# Patient Record
Sex: FEMALE | Employment: FULL TIME | URBAN - METROPOLITAN AREA
[De-identification: names, ages, dates, MRNs, and addresses within clinical notes are randomized per-mention and may not be internally consistent; named-entity substitution may affect disease eponyms.]

---
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
## 2024-02-29 NOTE — TELEPHONE ENCOUNTER
FUTURE VISIT INFORMATION      FUTURE VISIT INFORMATION:  Date: 5/28/24  Time: 9:00am  [place] [facility] [place]ERR[place] INFORMATION:  Reason for visit/diagnosis  top consult    RECORDS REQUESTED FROM:       No recs to collect

## 2024-05-14 ENCOUNTER — TELEPHONE (OUTPATIENT)
Dept: PLASTIC SURGERY | Facility: CLINIC | Age: 19
End: 2024-05-14
Payer: COMMERCIAL

## 2024-05-28 ENCOUNTER — OFFICE VISIT (OUTPATIENT)
Dept: PLASTIC SURGERY | Facility: CLINIC | Age: 19
End: 2024-05-28
Payer: COMMERCIAL

## 2024-05-28 ENCOUNTER — PRE VISIT (OUTPATIENT)
Dept: PLASTIC SURGERY | Facility: CLINIC | Age: 19
End: 2024-05-28

## 2024-05-28 VITALS
BODY MASS INDEX: 29.02 KG/M2 | HEART RATE: 76 BPM | HEIGHT: 64 IN | WEIGHT: 170 LBS | SYSTOLIC BLOOD PRESSURE: 120 MMHG | DIASTOLIC BLOOD PRESSURE: 79 MMHG | OXYGEN SATURATION: 98 %

## 2024-05-28 DIAGNOSIS — Z78.9 TRANSGENDER: ICD-10-CM

## 2024-05-28 PROCEDURE — 99203 OFFICE O/P NEW LOW 30 MIN: CPT | Performed by: SURGERY

## 2024-05-28 RX ORDER — ASCORBIC ACID 250 MG
TABLET,CHEWABLE ORAL
COMMUNITY
Start: 2018-06-01

## 2024-05-28 RX ORDER — CLINDAMYCIN PHOSPHATE 10 UG/ML
LOTION TOPICAL
COMMUNITY
Start: 2023-05-05

## 2024-05-28 RX ORDER — TRETINOIN 0.25 MG/G
CREAM TOPICAL
COMMUNITY
Start: 2023-05-05

## 2024-05-28 RX ORDER — OMEGA-3S/DHA/EPA/FISH OIL/D3 300MG-1000
1 CAPSULE ORAL
COMMUNITY
Start: 2016-11-03

## 2024-05-28 RX ORDER — TESTOSTERONE ENANTHATE 200 MG/ML
200 INJECTION, SOLUTION INTRAMUSCULAR
COMMUNITY
Start: 2024-01-11

## 2024-05-28 ASSESSMENT — PAIN SCALES - GENERAL: PAINLEVEL: NO PAIN (0)

## 2024-05-28 NOTE — LETTER
"5/28/2024       RE: Itzel Avilez  [address] [address]     Dear Colleague,    Thank you for referring your patient, Itzel Avilez, to the [facility] [place] [facility]. Please see a copy of my visit note below.    PLASTICS NEW TOP   HPI: This is a 18 year old adult who identifies as nonbinary with a history of gender dysphoria and ITP who presents today with their mom, Majo for a consultation for top surgery. Their preferred pronouns are he/him; their preferred name is Huang. They were referred by [facility] where he is already a patient. They made their appointment 5 months ago. Their therapist is Teresa Martinez at [facility] who has not yet written them a letter of support. He has been seeing her for about 9 months. They have been on testosterone for about 8 months (weekly injection), administered by Dr. Marika Stovall at [facility]. They have been living their chosen gender identity/role for 3 years. They bind with a Spectrum binder, \"GC2B hurts.\" No breast lumps, skin puckering, nipple drainage, or other breast problems. No history of breast imaging, including mammogram or breast ultrasound.     He and his mom are from [place], [place]. Huang drove about an hour to be here today.      Medical Hx: Gender dysphoria. No history of asthma, diabetes mellitus, or GERD. No healing or scarring problems. Sees Dr. Tonny Munoz at [facility] for ITP. Seasonal allergies. Also can't take ibuprofen due to bleeding issues. Last time they were checked platelets were 135k, which is very good for him. More often they are in the 30'-40's.    Mental Hx: Generalized anxiety disorder.    Surgical Hx: [place] teeth removed at age 15.  Bone marrow biopsy in 6th grade at [employer]. No known complications with anesthesia.     Family Hx: No family history of " "breast or ovarian cancer. Paternal Grandma had a hysterectomy for unknown reasons. Dad has WPW. Mom is healthy. Maternal grandmother has hypertension, type II diabetes and coronary artery disease. Has a brother, \"he's the healthiest person I've ever met in my life.\"    Social Hx: Occupation: Psychology major, minors in neuroscience and substance abuse. Will be a ev. Relationship status/family: is a  at [employer] but otherwise is living at home with parents; Mom has a friend who's son has had top surgery, so mom feels prepared to  help take care of them after surgery. Smoking status: negative.  Alcohol use: social. Diet: chicken and vegetables,Omnivore. Caffeine: \"I love caffeine.. a lot of espresso... I try and keep it to 1 per day (2-3 per day during school, especially finals).\" Exercise: hiking and walking most days. Sleep: \"I love sleep\" takes melatonin. Averages 8-10 hours.    PE: General: Height: 5' 4\" Weight: 170 lbs 0 oz BMI: 29.18 kg/m   Chest:   Nice upper chest contour.   Grade 2 nipple ptosis.   right breast (400-500g) is slightly larger than the left (300-400 g).   IMFs situated about 1-2 cms below the pec muscle.   Incisions may touch at midline  left IMF situated about 0.5 cm lower than the right.   minor lateral thoracic rolls.   Slight anterior axillary fullness.    Minimal striae.  No lymphadenopathy or masses.   Very dense no masses  Photos taken with consent.     A&P: 18 year old adult who identifies as nonbinary who is a good candidate for gender affirming top surgery with one of the following: bilateral simple mastectomy vs. breast reduction, +/- possible nipple graft reconstruction. They will most likely need a bilateral simple mastectomy with nipple graft reconstruction depending on intraoperative findings and the patient's desired outcome. The patient is NOT interested in nipple grafts. The patient will NOT need a pre-op mammogram in addition to an H&P from their " PCP. They are interested in nipple tattoos, and are recommended to see M at [facility].       They did not meet with our Transgender Care , Agustina, but they will be in contact via [employer] to discuss communications with staff and timeline. They did receive an introductory folder of information and contact numbers/names. Any further discussion of risks and complications will be reviewed during the pre-op visit.    Patient accepts the risks of this procedure and would like to proceed with surgery. They are able to give informed consent for this medically necessary procedure. Once we receive and review their therapist letter of support we will initiate the prior authorization process.     According to [place] Case Law and [facility] standards of care, with an appropriate letter of support from a mental health provider, top surgery/mastectomy is medically necessary for the treatment of gender dysphoria.     Total time = 30 minutes, spent on the date of encounter doing chart review, history and physical, dressing changes, documentation, patient education, and any further activity as noted above.     This note was prepared on behalf of Oksana Garcia MD by Ashleigh Rosado (they/them), a trained medical scribe, based on my observations and the provider's statements to me.         Again, thank you for allowing me to participate in the care of your patient.      Sincerely,    Oksana Garcia MD

## 2024-05-28 NOTE — PROGRESS NOTES
"PLASTICS [facility]   HPI: This is a 18 year old adult who identifies as nonbinary with a history of gender dysphoria and ITP who presents today with their mom, Majo for a consultation for top surgery. Their preferred pronouns are he/him; their preferred name is Huang. They were referred by [facility] where he is already a patient. They made their appointment 5 months ago. Their therapist is Teresa Martinez at [facility] who has not yet written them a letter of support. He has been seeing her for about 9 months. They have been on testosterone for about 8 months (weekly injection), administered by Dr. Marika Stovall at [facility]. They have been living their chosen gender identity/role for 3 years. They bind with a Spectrum binder, \"GC2B hurts.\" No breast lumps, skin puckering, nipple drainage, or other breast problems. No history of breast imaging, including mammogram or breast ultrasound.     He and his mom are from [place], [place]. Huang drove about an hour to be here today.      Medical Hx: Gender dysphoria. No history of asthma, diabetes mellitus, or GERD. No healing or scarring problems. Sees Dr. Tonny Munoz at [facility] for ITP. Seasonal allergies. Also can't take ibuprofen due to bleeding issues. Last time they were checked platelets were 135k, which is very good for him. More often they are in the 30'-40's.    Mental Hx: Generalized anxiety disorder.    Surgical Hx: [place] teeth removed at age 15.  Bone marrow biopsy in 6th grade at [employer]. No known complications with anesthesia.     Family Hx: No family history of breast or ovarian cancer. Paternal Grandma had a hysterectomy for unknown reasons. Dad has WPW. Mom is healthy. Maternal grandmother has hypertension, type II diabetes and coronary artery disease. Has a brother, \"he's the healthiest person I've ever met in my life.\"    Social Hx: Occupation: Psychology major, minors in " "neuroscience and substance abuse. Will be a ev. Relationship status/family: is a  at [employer] but otherwise is living at home with parents; Mom has a friend who's son has had top surgery, so mom feels prepared to  help take care of them after surgery. Smoking status: negative.  Alcohol use: social. Diet: chicken and vegetables,Omnivore. Caffeine: \"I love caffeine.. a lot of espresso... I try and keep it to 1 per day (2-3 per day during school, especially finals).\" Exercise: hiking and walking most days. Sleep: \"I love sleep\" takes melatonin. Averages 8-10 hours.    PE: General: Height: 5' 4\" Weight: 170 lbs 0 oz BMI: 29.18 kg/m   Chest:   Nice upper chest contour.   Grade 2 nipple ptosis.   right breast (400-500g) is slightly larger than the left (300-400 g).   IMFs situated about 1-2 cms below the pec muscle.   Incisions may touch at midline  left IMF situated about 0.5 cm lower than the right.   minor lateral thoracic rolls.   Slight anterior axillary fullness.    Minimal striae.  No lymphadenopathy or masses.   Very dense no masses  Photos taken with consent.     A&P: 18 year old adult who identifies as nonbinary who is a good candidate for gender affirming top surgery with one of the following: bilateral simple mastectomy vs. breast reduction, +/- possible nipple graft reconstruction. They will most likely need a bilateral simple mastectomy with nipple graft reconstruction depending on intraoperative findings and the patient's desired outcome. The patient is NOT interested in nipple grafts. The patient will NOT need a pre-op mammogram in addition to an H&P from their PCP. They are interested in nipple tattoos, and are recommended to see M at [facility].       They did not meet with our Transgender Care , Agustina, but they will be in contact via [employer] to discuss communications with staff and timeline. They did receive an introductory folder of information and contact " numbers/names. Any further discussion of risks and complications will be reviewed during the pre-op visit.    Patient accepts the risks of this procedure and would like to proceed with surgery. They are able to give informed consent for this medically necessary procedure. Once we receive and review their therapist letter of support we will initiate the prior authorization process.     According to [place] Case Law and [facility] standards of care, with an appropriate letter of support from a mental health provider, top surgery/mastectomy is medically necessary for the treatment of gender dysphoria.     Total time = 30 minutes, spent on the date of encounter doing chart review, history and physical, dressing changes, documentation, patient education, and any further activity as noted above.     This note was prepared on behalf of Oksana Garcia MD by Ashleigh Rosado (they/them), a trained medical scribe, based on my observations and the provider's statements to me.

## 2024-05-28 NOTE — NURSING NOTE
"Chief Complaint   Patient presents with    Consult     Huang, is being seen today for a consult regarding top surgery.       Vitals:    05/28/24 0922   BP: 120/79   BP Location: Left arm   Patient Position: Chair   Cuff Size: Adult Regular   Pulse: 76   SpO2: 98%   Weight: 77.1 kg (170 lb)   Height: 1.626 m (5' 4\")       Body mass index is 29.18 kg/m .      Francie Mcbride LPN    "

## 2024-06-06 ENCOUNTER — TRANSFERRED RECORDS (OUTPATIENT)
Dept: HEALTH INFORMATION MANAGEMENT | Facility: CLINIC | Age: 19
End: 2024-06-06
Payer: COMMERCIAL

## 2024-06-17 ENCOUNTER — DOCUMENTATION ONLY (OUTPATIENT)
Dept: PLASTIC SURGERY | Facility: CLINIC | Age: 19
End: 2024-06-17
Payer: COMMERCIAL

## 2024-06-17 NOTE — PROGRESS NOTES
[facility] :  Care Coordination Note     SITUATION   Huang Avilez (they/he) is a 18 year old adult who is receiving support for:  Care Team (LOS Review)  .    BACKGROUND     LOS received and meets criteria (faxed to HIM from writers email dated 6/15/24. Forwarded to PA coordinator and RNCC). Updated pt list. Sent message to pt updating them on LOS status.    ASSESSMENT     Surgery              CGC Assessment  Comprehensive Gender Care (CGC) Enrollment: Enrolled  Letter of support #1: Received  Letter #1 Date: 05/28/24  Surgery being considered: Yes  Mastectomy: Yes      PLAN          Nursing Interventions:       Follow-up plan:  Surgeon will place ALON Ybarra

## 2024-07-08 DIAGNOSIS — F64.0 GENDER DYSPHORIA IN ADULT: Primary | ICD-10-CM

## 2024-07-09 ENCOUNTER — MYC MEDICAL ADVICE (OUTPATIENT)
Dept: PLASTIC SURGERY | Facility: CLINIC | Age: 19
End: 2024-07-09
Payer: COMMERCIAL

## 2024-07-09 ENCOUNTER — TELEPHONE (OUTPATIENT)
Dept: PLASTIC SURGERY | Facility: CLINIC | Age: 19
End: 2024-07-09
Payer: COMMERCIAL

## 2024-07-09 NOTE — TELEPHONE ENCOUNTER
RN Care Coordinator: Karly Hilliardnikicalixto    Surgery is scheduled with Dr. Garcia  Date: 12/30/2024   Location: [facility]    H&P to be completed by Primary Care team - patient instructed to schedule PCP per patient, this will be scheduled with [facility] Ramana SUNSHINE.     Surgical consult: 11/19/2024 at 8:30am with Dr. Garcia    Post-op visit(s):   1/6/2025 at 10:30am with GERARDO Weiner CNP  2/4/2024 at 10:30am with Dr. Garcia      Patient will receive a phone call from surgery center pre-operative nurses 3-5 days prior to surgery with arrival time and NPO instructions.     Spoke with patient, confirmed all scheduled information.       Patient questions/concerns: N/A       Surgery packet to be sent via US mail    __    Helena Galdamez on 7/9/2024 at 3:34 PM  P: [phone]

## 2024-07-10 ENCOUNTER — DOCUMENTATION ONLY (OUTPATIENT)
Dept: PLASTIC SURGERY | Facility: CLINIC | Age: 19
End: 2024-07-10
Payer: COMMERCIAL

## 2024-07-10 NOTE — PROGRESS NOTES
[facility] :  Care Coordination Note     SITUATION   Huang is a 18 year old adult who is receiving support for: gender dysphoria.    BACKGROUND     Pt has seen Dr Garcia for gender affirming top surgery consultation.  Pt has surgery scheduled for 12/30/24.    ASSESSMENT     Pt LOS in chart dated 5/28/24 and reviewed by our .  Pt will not need a pre-op mammogram. Pt does not use nicotine.  Pt has ITP that can fluctuate greatly.       PLAN     Follow-up plan:    Will contact pt to inform of need for pre-op H&P within 30 days of surgery.  RN will contact patient's heme/onc medical provider to get results of latest platelet count and to get advice on debbie-operative and post-operative needs, if any.   Pt to have pre-op surgical consultation with Dr Garcia 11/19/24.     ABIGAIL Brandt

## 2024-07-13 ENCOUNTER — HEALTH MAINTENANCE LETTER (OUTPATIENT)
Age: 19
End: 2024-07-13

## 2024-07-19 ENCOUNTER — TRANSFERRED RECORDS (OUTPATIENT)
Dept: HEALTH INFORMATION MANAGEMENT | Facility: CLINIC | Age: 19
End: 2024-07-19
Payer: COMMERCIAL

## 2024-11-04 ENCOUNTER — PREP FOR PROCEDURE (OUTPATIENT)
Dept: OCCUPATIONAL THERAPY | Facility: CLINIC | Age: 19
End: 2024-11-04
Payer: COMMERCIAL

## 2024-11-19 ENCOUNTER — VIRTUAL VISIT (OUTPATIENT)
Dept: PLASTIC SURGERY | Facility: CLINIC | Age: 19
End: 2024-11-19
Payer: COMMERCIAL

## 2024-11-19 ENCOUNTER — TELEPHONE (OUTPATIENT)
Dept: PLASTIC SURGERY | Facility: CLINIC | Age: 19
End: 2024-11-19

## 2024-11-19 DIAGNOSIS — F64.0 GENDER DYSPHORIA IN ADULT: Primary | ICD-10-CM

## 2024-11-19 ASSESSMENT — PAIN SCALES - GENERAL: PAINLEVEL_OUTOF10: NO PAIN (0)

## 2024-11-19 NOTE — PROGRESS NOTES
"PLASTICS PRE-OP  This is a 19 year old non-binary adult who presents for a scheduled pre-op visit via telehealth prior to bilateral simple mastectomy with nipple graft reconstruction scheduled for 12/30/24. The patient's letter of support has been received. History and physical still to be scheduled.    NO NSAIDS due to ITP.   We will get an ARMANI to speak to Dr. Munoz who follows Huang's ITP (though we did get verbal permission from Huang already during our visit today).     Last time they took medications for increasing platelet count was when they were \"like 11\" and they took PO pills at that time. Their most recent count was in the 30,000 range. Numbers have dipped in the past after Huang suffered a URI. We are waiting for his labs drawn just yesterday.    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 Everything is prepped and ready to go so Huang will have no issues following post-op restrictions.     Patient was counseled regarding the need to quit smoking nicotine products within at least 6 weeks before surgery date and agreed to these terms at the original consult appointment. Patient has not been a smoker in the past and continues to abstain from nicotine use.     Our Gender Care Team member Karly, discussed periop instructions with the patient including: not eating anything 8 hours prior to surgery, drinking clear liquids up to 2 hours before surgery except for morning medications with a sip of water, the preop shower with surgical soap which was given, and wearing a button- or zip-up shirt on the day of surgery. The patient was also instructed to avoid NSAIDs x 1 week both before AND after surgery, but he may take Tylenol post-op for pain as needed. The patient was provided with a folder of information on debbie-op topics, including where the surgery will be.     I discussed the following with the patient; preop, intraop and postop phases of care on the day of surgery, " the placement of a bladder catheter during surgery that will likely be removed in recovery, postop cares and limitations with relation to home and work settings, 5-lb weight restriction for the first 3 weeks postop, and how long to maintain limited activities. We also discussed Zofran, oxycodone, Z-luciana, and antipruritics which will be prescribed. We discussed that preventing constipation will be their responsibility, and we discussed methods such as aloe, prune juice or Miralax. We discussed scar reducing techniques, such as Mederma, silicone strips, vitamin E oil, emu oil, massage and when they may begin using these.     In addition, we went over the possible risks and complications involved with this elective procedure. These include but are not limited to: infection, bleeding, hematoma/seroma formation, and poor healing (including dehiscence, nipple graft loss, or hypertrophic scarring). We also discussed the possibility of altered chest sensation (either hypo or hypersensitive), residual deformities and asymmetries, possible further surgical revisions, and possible injury to surrounding neurovascular and musculoskeletal structures, including intra-axillary or intra-thoracic. We lastly discussed anesthetic risks including DVT/PE or cardiopulmonary events.      All questions were answered. Huang will bring any other questions that arise to the day of surgery.    Total time = 20 minutes, spent on the date of encounter doing chart review, history and physical, dressing changes, documentation, patient education, and any further activity as noted above.     This note was prepared on behalf of Oksana Garcia MD by Juana Rodgers (she/her), a trained medical scribe, based on my observations and the provider's statements to me.

## 2024-11-19 NOTE — NURSING NOTE
Chief Complaint   Patient presents with    YULISA Encinas, is participating in a virtual visit today for a pre-op DOS 12/30/24.       There were no vitals filed for this visit.    There is no height or weight on file to calculate BMI.      Francie Mcbride LPN

## 2024-11-19 NOTE — PROGRESS NOTES
Huang is a 19 year old who is being evaluated via a billable video visit.    How would you like to obtain your AVS? MyChart  If the video visit is dropped, the invitation should be resent by: Text to cell phone: [phone]  Will anyone else be joining your video visit? No

## 2024-11-19 NOTE — LETTER
"11/19/2024       RE: Itzel Avilez  [address] [address]     Dear Colleague,    Thank you for referring your patient, Itzel Avilez, to the [facility] [place] [facility]. Please see a copy of my visit note below.    Huang is a 19 year old who is being evaluated via a billable video visit.    How would you like to obtain your AVS? MyChart  If the video visit is dropped, the invitation should be resent by: Text to cell phone: [phone]  Will anyone else be joining your video visit? No        PLASTICS PRE-OP  This is a 19 year old non-binary adult who presents for a scheduled pre-op visit via telehealth prior to bilateral simple mastectomy with nipple graft reconstruction scheduled for 12/30/24. The patient's letter of support has been received. History and physical still to be scheduled.    NO NSAIDS due to ITP.   We will get an ARMANI to speak to Dr. Munoz who follows Huang's ITP (though we did get verbal permission from Huang already during our visit today).     Last time they took medications for increasing platelet count was when they were \"like 11\" and they took PO pills at that time. Their most recent count was in the 30,000 range. Numbers have dipped in the past after Huang suffered a URI. We are waiting for his labs drawn just yesterday.    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 Everything is prepped and ready to go so Huang will have no issues following post-op restrictions.     Patient was counseled regarding the need to quit smoking nicotine products within at least 6 weeks before surgery date and agreed to these terms at the original consult appointment. Patient has not been a smoker in the past and continues to abstain from nicotine use.     Our Gender Care Team member Karly, discussed periop instructions with the patient including: not eating " anything 8 hours prior to surgery, drinking clear liquids up to 2 hours before surgery except for morning medications with a sip of water, the preop shower with surgical soap which was given, and wearing a button- or zip-up shirt on the day of surgery. The patient was also instructed to avoid NSAIDs x 1 week both before AND after surgery, but he may take Tylenol post-op for pain as needed. The patient was provided with a folder of information on debbie-op topics, including where the surgery will be.     I discussed the following with the patient; preop, intraop and postop phases of care on the day of surgery, the placement of a bladder catheter during surgery that will likely be removed in recovery, postop cares and limitations with relation to home and work settings, 5-lb weight restriction for the first 3 weeks postop, and how long to maintain limited activities. We also discussed Zofran, oxycodone, Z-luciana, and antipruritics which will be prescribed. We discussed that preventing constipation will be their responsibility, and we discussed methods such as aloe, prune juice or Miralax. We discussed scar reducing techniques, such as Mederma, silicone strips, vitamin E oil, emu oil, massage and when they may begin using these.     In addition, we went over the possible risks and complications involved with this elective procedure. These include but are not limited to: infection, bleeding, hematoma/seroma formation, and poor healing (including dehiscence, nipple graft loss, or hypertrophic scarring). We also discussed the possibility of altered chest sensation (either hypo or hypersensitive), residual deformities and asymmetries, possible further surgical revisions, and possible injury to surrounding neurovascular and musculoskeletal structures, including intra-axillary or intra-thoracic. We lastly discussed anesthetic risks including DVT/PE or cardiopulmonary events.      All questions were answered. Huang will bring any  other questions that arise to the day of surgery.    Total time = 20 minutes, spent on the date of encounter doing chart review, history and physical, dressing changes, documentation, patient education, and any further activity as noted above.     This note was prepared on behalf of Oksana Garcia MD by Juana Rodgers (she/her), a trained medical scribe, based on my observations and the provider's statements to me.          Again, thank you for allowing me to participate in the care of your patient.      Sincerely,    Oksana Garcia MD

## 2024-11-19 NOTE — TELEPHONE ENCOUNTER
Dr. Hong's office reached out to this Writer ([facility]) to discuss patient's ITP management. Huang's last platelet was 72K taken yesterday. Hal's nurse states their goal for him is to be above 50K and asked RN what Jose's goal was. RN explained this is usually > 55K for our patients but we usually follow heme's advice. Dr. Hong's NAT, [employer], will see Huang the week of surgery and get another Platelt. Sara will reach out about our request for debbie-op and post-op management of ITP.  Karly De Jesus, ABIGAIL on 11/19/2024 at 11:04 AM

## 2024-12-19 ENCOUNTER — TELEPHONE (OUTPATIENT)
Dept: PLASTIC SURGERY | Facility: CLINIC | Age: 19
End: 2024-12-19
Payer: COMMERCIAL

## 2024-12-19 NOTE — TELEPHONE ENCOUNTER
RN called NAT at [facility] to follow up on Huang's upcoming surgery. Spoke to ABIGAIL Marmolejo.  Huang has his appoitntment Monday 12/23/25. Labs will be done (CMP and CBC) and these will be faxed to us along with any recommendations to us for debbie-op care  Karly De Jesus RN on 12/19/2024 at 4:19 PM

## 2024-12-23 ENCOUNTER — TRANSFERRED RECORDS (OUTPATIENT)
Dept: HEALTH INFORMATION MANAGEMENT | Facility: CLINIC | Age: 19
End: 2024-12-23
Payer: COMMERCIAL

## 2024-12-23 RX ORDER — GABAPENTIN 600 MG/1
600 TABLET ORAL DAILY
COMMUNITY

## 2024-12-24 ENCOUNTER — TELEPHONE (OUTPATIENT)
Dept: PLASTIC SURGERY | Facility: CLINIC | Age: 19
End: 2024-12-24
Payer: COMMERCIAL

## 2024-12-24 NOTE — TELEPHONE ENCOUNTER
Received Fax information from hematology. (In chart)    RN discussed faxed info with Dr Garcia.   Summary:  Plt - 69K. Recheck scheduled 12/27/24 at [facility].     Plan - if recheck plt is < 50K, Heme will give dexamethasone to increase counts over the weekend. Heme will follow plt labs for at least 2 weeks after surgery. Refill of TXA given to pt by Heme.    Surgery team will have TXA available for use in OR.

## 2024-12-29 ENCOUNTER — ANESTHESIA EVENT (OUTPATIENT)
Dept: SURGERY | Facility: CLINIC | Age: 19
End: 2024-12-29

## 2024-12-30 ENCOUNTER — ANESTHESIA (OUTPATIENT)
Dept: SURGERY | Facility: CLINIC | Age: 19
End: 2024-12-30

## 2024-12-30 ENCOUNTER — HOSPITAL ENCOUNTER (OUTPATIENT)
Facility: CLINIC | Age: 19
Discharge: HOME | End: 2024-12-30
Attending: SURGERY | Admitting: SURGERY
Payer: COMMERCIAL

## 2024-12-30 VITALS
HEART RATE: 75 BPM | DIASTOLIC BLOOD PRESSURE: 83 MMHG | RESPIRATION RATE: 18 BRPM | TEMPERATURE: 97.7 F | BODY MASS INDEX: 29.92 KG/M2 | OXYGEN SATURATION: 99 % | SYSTOLIC BLOOD PRESSURE: 141 MMHG | WEIGHT: 168.87 LBS | HEIGHT: 63 IN

## 2024-12-30 PROCEDURE — 999N000141 HC STATISTIC PRE-PROCEDURE NURSING ASSESSMENT: Performed by: SURGERY

## 2024-12-30 PROCEDURE — 999N000001 HC CANCELLED SURGERY UP TO 15 MINS: Performed by: SURGERY

## 2024-12-30 RX ORDER — CEFAZOLIN SODIUM/WATER 2 G/20 ML
2 SYRINGE (ML) INTRAVENOUS SEE ADMIN INSTRUCTIONS
Status: DISCONTINUED | OUTPATIENT
Start: 2024-12-30 | End: 2024-12-30 | Stop reason: HOSPADM

## 2024-12-30 RX ORDER — CEFAZOLIN SODIUM/WATER 2 G/20 ML
2 SYRINGE (ML) INTRAVENOUS
Status: DISCONTINUED | OUTPATIENT
Start: 2024-12-30 | End: 2024-12-30 | Stop reason: HOSPADM

## 2024-12-30 ASSESSMENT — ACTIVITIES OF DAILY LIVING (ADL): ADLS_ACUITY_SCORE: 35

## 2024-12-30 NOTE — PROGRESS NOTES
Surgery was cancelled due to surgeon unable to proceed due to illness. Situation was explained to patient and his family, parking was validated - patient and family acknowledged understanding. Total of 31 minutes spent with patient.

## 2025-05-16 ENCOUNTER — TRANSFERRED RECORDS (OUTPATIENT)
Dept: HEALTH INFORMATION MANAGEMENT | Facility: CLINIC | Age: 20
End: 2025-05-16
Payer: COMMERCIAL

## 2025-05-19 ENCOUNTER — ANESTHESIA EVENT (OUTPATIENT)
Dept: SURGERY | Facility: CLINIC | Age: 20
End: 2025-05-19
Payer: COMMERCIAL

## 2025-05-20 ENCOUNTER — ANESTHESIA (OUTPATIENT)
Dept: SURGERY | Facility: CLINIC | Age: 20
End: 2025-05-20
Payer: COMMERCIAL

## 2025-05-20 ENCOUNTER — HOSPITAL ENCOUNTER (OUTPATIENT)
Facility: CLINIC | Age: 20
End: 2025-05-20
Attending: SURGERY | Admitting: SURGERY
Payer: COMMERCIAL

## 2025-05-20 ENCOUNTER — TELEPHONE (OUTPATIENT)
Dept: PLASTIC SURGERY | Facility: CLINIC | Age: 20
End: 2025-05-20

## 2025-05-20 VITALS
BODY MASS INDEX: 29.8 KG/M2 | TEMPERATURE: 96.8 F | DIASTOLIC BLOOD PRESSURE: 87 MMHG | OXYGEN SATURATION: 96 % | RESPIRATION RATE: 18 BRPM | SYSTOLIC BLOOD PRESSURE: 128 MMHG | HEART RATE: 74 BPM | WEIGHT: 168.21 LBS | HEIGHT: 63 IN

## 2025-05-20 DIAGNOSIS — F64.9 GENDER DYSPHORIA: Primary | ICD-10-CM

## 2025-05-20 LAB
MCV RBC AUTO: 87 FL (ref 78–100)
PLATELET # BLD AUTO: 169 10E3/UL (ref 150–450)

## 2025-05-20 PROCEDURE — 250N000011 HC RX IP 250 OP 636: Performed by: NURSE ANESTHETIST, CERTIFIED REGISTERED

## 2025-05-20 PROCEDURE — 250N000009 HC RX 250: Performed by: ANESTHESIOLOGY

## 2025-05-20 PROCEDURE — 88305 TISSUE EXAM BY PATHOLOGIST: CPT | Mod: 26 | Performed by: PATHOLOGY

## 2025-05-20 PROCEDURE — 88305 TISSUE EXAM BY PATHOLOGIST: CPT | Mod: TC | Performed by: SURGERY

## 2025-05-20 PROCEDURE — 710N000012 HC RECOVERY PHASE 2, PER MINUTE: Performed by: SURGERY

## 2025-05-20 PROCEDURE — 250N000011 HC RX IP 250 OP 636: Performed by: ANESTHESIOLOGY

## 2025-05-20 PROCEDURE — 999N000141 HC STATISTIC PRE-PROCEDURE NURSING ASSESSMENT: Performed by: SURGERY

## 2025-05-20 PROCEDURE — 272N000001 HC OR GENERAL SUPPLY STERILE: Performed by: SURGERY

## 2025-05-20 PROCEDURE — 250N000011 HC RX IP 250 OP 636: Performed by: SURGERY

## 2025-05-20 PROCEDURE — 250N000009 HC RX 250: Performed by: SURGERY

## 2025-05-20 PROCEDURE — 710N000010 HC RECOVERY PHASE 1, LEVEL 2, PER MIN: Performed by: SURGERY

## 2025-05-20 PROCEDURE — 250N000009 HC RX 250: Performed by: NURSE ANESTHETIST, CERTIFIED REGISTERED

## 2025-05-20 PROCEDURE — 258N000003 HC RX IP 258 OP 636: Performed by: NURSE ANESTHETIST, CERTIFIED REGISTERED

## 2025-05-20 PROCEDURE — 271N000002 HC RX 271: Performed by: SURGERY

## 2025-05-20 PROCEDURE — 19303 MAST SIMPLE COMPLETE: CPT | Mod: 50 | Performed by: SURGERY

## 2025-05-20 PROCEDURE — 85049 AUTOMATED PLATELET COUNT: CPT | Performed by: NURSE ANESTHETIST, CERTIFIED REGISTERED

## 2025-05-20 PROCEDURE — 370N000017 HC ANESTHESIA TECHNICAL FEE, PER MIN: Performed by: SURGERY

## 2025-05-20 PROCEDURE — 360N000076 HC SURGERY LEVEL 3, PER MIN: Performed by: SURGERY

## 2025-05-20 PROCEDURE — 250N000013 HC RX MED GY IP 250 OP 250 PS 637: Performed by: ANESTHESIOLOGY

## 2025-05-20 PROCEDURE — 250N000025 HC SEVOFLURANE, PER MIN: Performed by: SURGERY

## 2025-05-20 PROCEDURE — 19350 NIPPLE/AREOLA RECONSTRUCTION: CPT | Mod: 50 | Performed by: SURGERY

## 2025-05-20 RX ORDER — FENTANYL CITRATE 50 UG/ML
25 INJECTION, SOLUTION INTRAMUSCULAR; INTRAVENOUS EVERY 5 MIN PRN
Status: DISCONTINUED | OUTPATIENT
Start: 2025-05-20 | End: 2025-05-26 | Stop reason: HOSPADM

## 2025-05-20 RX ORDER — PROPOFOL 10 MG/ML
INJECTION, EMULSION INTRAVENOUS PRN
Status: DISCONTINUED | OUTPATIENT
Start: 2025-05-20 | End: 2025-05-20

## 2025-05-20 RX ORDER — CEFAZOLIN SODIUM/WATER 2 G/20 ML
2 SYRINGE (ML) INTRAVENOUS
Status: DISCONTINUED | OUTPATIENT
Start: 2025-05-20 | End: 2025-05-20 | Stop reason: HOSPADM

## 2025-05-20 RX ORDER — HYDROXYZINE HYDROCHLORIDE 25 MG/1
25 TABLET, FILM COATED ORAL 3 TIMES DAILY PRN
Qty: 12 TABLET | Refills: 0 | Status: SHIPPED | OUTPATIENT
Start: 2025-05-20

## 2025-05-20 RX ORDER — TRANEXAMIC ACID 10 MG/ML
1 INJECTION, SOLUTION INTRAVENOUS ONCE
Status: COMPLETED | OUTPATIENT
Start: 2025-05-20 | End: 2025-05-20

## 2025-05-20 RX ORDER — HYDROMORPHONE HYDROCHLORIDE 1 MG/ML
0.4 INJECTION, SOLUTION INTRAMUSCULAR; INTRAVENOUS; SUBCUTANEOUS EVERY 5 MIN PRN
Status: DISCONTINUED | OUTPATIENT
Start: 2025-05-20 | End: 2025-05-26 | Stop reason: HOSPADM

## 2025-05-20 RX ORDER — DEXAMETHASONE SODIUM PHOSPHATE 4 MG/ML
4 INJECTION, SOLUTION INTRA-ARTICULAR; INTRALESIONAL; INTRAMUSCULAR; INTRAVENOUS; SOFT TISSUE
Status: DISCONTINUED | OUTPATIENT
Start: 2025-05-20 | End: 2025-05-26 | Stop reason: HOSPADM

## 2025-05-20 RX ORDER — HYDROMORPHONE HYDROCHLORIDE 1 MG/ML
0.2 INJECTION, SOLUTION INTRAMUSCULAR; INTRAVENOUS; SUBCUTANEOUS EVERY 5 MIN PRN
Status: DISCONTINUED | OUTPATIENT
Start: 2025-05-20 | End: 2025-05-26 | Stop reason: HOSPADM

## 2025-05-20 RX ORDER — ONDANSETRON 2 MG/ML
INJECTION INTRAMUSCULAR; INTRAVENOUS PRN
Status: DISCONTINUED | OUTPATIENT
Start: 2025-05-20 | End: 2025-05-20

## 2025-05-20 RX ORDER — SODIUM CHLORIDE, SODIUM LACTATE, POTASSIUM CHLORIDE, CALCIUM CHLORIDE 600; 310; 30; 20 MG/100ML; MG/100ML; MG/100ML; MG/100ML
INJECTION, SOLUTION INTRAVENOUS CONTINUOUS PRN
Status: DISCONTINUED | OUTPATIENT
Start: 2025-05-20 | End: 2025-05-20

## 2025-05-20 RX ORDER — OXYCODONE HYDROCHLORIDE 5 MG/1
5 TABLET ORAL EVERY 6 HOURS PRN
Qty: 12 TABLET | Refills: 0 | Status: SHIPPED | OUTPATIENT
Start: 2025-05-20

## 2025-05-20 RX ORDER — KETAMINE HYDROCHLORIDE 10 MG/ML
INJECTION INTRAMUSCULAR; INTRAVENOUS PRN
Status: DISCONTINUED | OUTPATIENT
Start: 2025-05-20 | End: 2025-05-20

## 2025-05-20 RX ORDER — LIDOCAINE HYDROCHLORIDE 20 MG/ML
INJECTION, SOLUTION INFILTRATION; PERINEURAL PRN
Status: DISCONTINUED | OUTPATIENT
Start: 2025-05-20 | End: 2025-05-20

## 2025-05-20 RX ORDER — ONDANSETRON 4 MG/1
4 TABLET, ORALLY DISINTEGRATING ORAL EVERY 30 MIN PRN
Status: DISCONTINUED | OUTPATIENT
Start: 2025-05-20 | End: 2025-05-26 | Stop reason: HOSPADM

## 2025-05-20 RX ORDER — SODIUM CHLORIDE, SODIUM LACTATE, POTASSIUM CHLORIDE, CALCIUM CHLORIDE 600; 310; 30; 20 MG/100ML; MG/100ML; MG/100ML; MG/100ML
INJECTION, SOLUTION INTRAVENOUS CONTINUOUS
Status: DISCONTINUED | OUTPATIENT
Start: 2025-05-20 | End: 2025-05-26 | Stop reason: HOSPADM

## 2025-05-20 RX ORDER — CEFAZOLIN SODIUM/WATER 2 G/20 ML
2 SYRINGE (ML) INTRAVENOUS
Status: COMPLETED | OUTPATIENT
Start: 2025-05-20 | End: 2025-05-20

## 2025-05-20 RX ORDER — FENTANYL CITRATE 50 UG/ML
INJECTION, SOLUTION INTRAMUSCULAR; INTRAVENOUS PRN
Status: DISCONTINUED | OUTPATIENT
Start: 2025-05-20 | End: 2025-05-20

## 2025-05-20 RX ORDER — DEXAMETHASONE SODIUM PHOSPHATE 4 MG/ML
INJECTION, SOLUTION INTRA-ARTICULAR; INTRALESIONAL; INTRAMUSCULAR; INTRAVENOUS; SOFT TISSUE PRN
Status: DISCONTINUED | OUTPATIENT
Start: 2025-05-20 | End: 2025-05-20

## 2025-05-20 RX ORDER — FENTANYL CITRATE 50 UG/ML
50 INJECTION, SOLUTION INTRAMUSCULAR; INTRAVENOUS EVERY 5 MIN PRN
Status: DISCONTINUED | OUTPATIENT
Start: 2025-05-20 | End: 2025-05-26 | Stop reason: HOSPADM

## 2025-05-20 RX ORDER — NALOXONE HYDROCHLORIDE 0.4 MG/ML
0.1 INJECTION, SOLUTION INTRAMUSCULAR; INTRAVENOUS; SUBCUTANEOUS
Status: DISCONTINUED | OUTPATIENT
Start: 2025-05-20 | End: 2025-05-26 | Stop reason: HOSPADM

## 2025-05-20 RX ORDER — ONDANSETRON 4 MG/1
4 TABLET, ORALLY DISINTEGRATING ORAL EVERY 8 HOURS PRN
Qty: 12 TABLET | Refills: 0 | Status: SHIPPED | OUTPATIENT
Start: 2025-05-20

## 2025-05-20 RX ORDER — SCOPOLAMINE 1 MG/3D
1 PATCH, EXTENDED RELEASE TRANSDERMAL
Status: DISCONTINUED | OUTPATIENT
Start: 2025-05-20 | End: 2025-05-20 | Stop reason: HOSPADM

## 2025-05-20 RX ORDER — ONDANSETRON 2 MG/ML
4 INJECTION INTRAMUSCULAR; INTRAVENOUS EVERY 30 MIN PRN
Status: DISCONTINUED | OUTPATIENT
Start: 2025-05-20 | End: 2025-05-26 | Stop reason: HOSPADM

## 2025-05-20 RX ORDER — ACETAMINOPHEN 325 MG/1
975 TABLET ORAL ONCE
Status: COMPLETED | OUTPATIENT
Start: 2025-05-20 | End: 2025-05-20

## 2025-05-20 RX ORDER — DIPHENHYDRAMINE HYDROCHLORIDE 50 MG/ML
12.5 INJECTION, SOLUTION INTRAMUSCULAR; INTRAVENOUS ONCE
Status: COMPLETED | OUTPATIENT
Start: 2025-05-20 | End: 2025-05-20

## 2025-05-20 RX ORDER — AZITHROMYCIN 250 MG/1
TABLET, FILM COATED ORAL
Qty: 6 TABLET | Refills: 0 | Status: SHIPPED | OUTPATIENT
Start: 2025-05-20

## 2025-05-20 RX ORDER — CEFAZOLIN SODIUM/WATER 2 G/20 ML
2 SYRINGE (ML) INTRAVENOUS SEE ADMIN INSTRUCTIONS
Status: DISCONTINUED | OUTPATIENT
Start: 2025-05-20 | End: 2025-05-20 | Stop reason: HOSPADM

## 2025-05-20 RX ADMIN — PHENYLEPHRINE HYDROCHLORIDE 50 MCG: 10 INJECTION INTRAVENOUS at 08:39

## 2025-05-20 RX ADMIN — DEXMEDETOMIDINE HYDROCHLORIDE 4 MCG: 100 INJECTION, SOLUTION INTRAVENOUS at 08:52

## 2025-05-20 RX ADMIN — Medication 10 MG: at 11:39

## 2025-05-20 RX ADMIN — FENTANYL CITRATE 100 MCG: 50 INJECTION INTRAMUSCULAR; INTRAVENOUS at 08:01

## 2025-05-20 RX ADMIN — Medication 10 MG: at 10:57

## 2025-05-20 RX ADMIN — Medication 20 MG: at 10:04

## 2025-05-20 RX ADMIN — AMISULPRIDE 5 MG: 2.5 INJECTION, SOLUTION INTRAVENOUS at 14:42

## 2025-05-20 RX ADMIN — Medication: at 11:59

## 2025-05-20 RX ADMIN — Medication 20 MG: at 08:50

## 2025-05-20 RX ADMIN — SCOPOLAMINE 1 PATCH: 1.5 PATCH, EXTENDED RELEASE TRANSDERMAL at 07:37

## 2025-05-20 RX ADMIN — DIPHENHYDRAMINE HYDROCHLORIDE 12.5 MG: 50 INJECTION, SOLUTION INTRAMUSCULAR; INTRAVENOUS at 13:46

## 2025-05-20 RX ADMIN — SODIUM CHLORIDE, SODIUM LACTATE, POTASSIUM CHLORIDE, AND CALCIUM CHLORIDE: .6; .31; .03; .02 INJECTION, SOLUTION INTRAVENOUS at 08:29

## 2025-05-20 RX ADMIN — ONDANSETRON 4 MG: 2 INJECTION INTRAMUSCULAR; INTRAVENOUS at 12:40

## 2025-05-20 RX ADMIN — Medication 10 MG: at 10:27

## 2025-05-20 RX ADMIN — Medication 2 G: at 08:13

## 2025-05-20 RX ADMIN — DEXAMETHASONE SODIUM PHOSPHATE 10 MG: 4 INJECTION, SOLUTION INTRAMUSCULAR; INTRAVENOUS at 08:15

## 2025-05-20 RX ADMIN — HYDROMORPHONE HYDROCHLORIDE 0.5 MG: 1 INJECTION, SOLUTION INTRAMUSCULAR; INTRAVENOUS; SUBCUTANEOUS at 08:36

## 2025-05-20 RX ADMIN — SUGAMMADEX 100 MG: 100 INJECTION, SOLUTION INTRAVENOUS at 12:08

## 2025-05-20 RX ADMIN — ACETAMINOPHEN 975 MG: 325 TABLET ORAL at 16:28

## 2025-05-20 RX ADMIN — SODIUM CHLORIDE, SODIUM LACTATE, POTASSIUM CHLORIDE, CALCIUM CHLORIDE: 600; 310; 30; 20 INJECTION, SOLUTION INTRAVENOUS at 07:56

## 2025-05-20 RX ADMIN — DEXMEDETOMIDINE HYDROCHLORIDE 12 MCG: 100 INJECTION, SOLUTION INTRAVENOUS at 08:15

## 2025-05-20 RX ADMIN — MIDAZOLAM 2 MG: 1 INJECTION INTRAMUSCULAR; INTRAVENOUS at 07:55

## 2025-05-20 RX ADMIN — HYDROMORPHONE HYDROCHLORIDE 0.25 MG: 1 INJECTION, SOLUTION INTRAMUSCULAR; INTRAVENOUS; SUBCUTANEOUS at 11:53

## 2025-05-20 RX ADMIN — ONDANSETRON 4 MG: 2 INJECTION INTRAMUSCULAR; INTRAVENOUS at 11:59

## 2025-05-20 RX ADMIN — TRANEXAMIC ACID 1 G: 10 INJECTION, SOLUTION INTRAVENOUS at 11:01

## 2025-05-20 RX ADMIN — SUGAMMADEX 100 MG: 100 INJECTION, SOLUTION INTRAVENOUS at 12:11

## 2025-05-20 RX ADMIN — Medication 30 MG: at 08:42

## 2025-05-20 RX ADMIN — LIDOCAINE HYDROCHLORIDE 100 MG: 20 INJECTION, SOLUTION INFILTRATION; PERINEURAL at 08:01

## 2025-05-20 RX ADMIN — HYDROMORPHONE HYDROCHLORIDE 0.25 MG: 1 INJECTION, SOLUTION INTRAMUSCULAR; INTRAVENOUS; SUBCUTANEOUS at 09:02

## 2025-05-20 RX ADMIN — Medication 50 MG: at 08:01

## 2025-05-20 RX ADMIN — DIPHENHYDRAMINE HYDROCHLORIDE 12.5 MG: 50 INJECTION, SOLUTION INTRAMUSCULAR; INTRAVENOUS at 13:07

## 2025-05-20 RX ADMIN — PROPOFOL 200 MG: 10 INJECTION, EMULSION INTRAVENOUS at 08:01

## 2025-05-20 ASSESSMENT — ACTIVITIES OF DAILY LIVING (ADL)
ADLS_ACUITY_SCORE: 36
ADLS_ACUITY_SCORE: 36
ADLS_ACUITY_SCORE: 35
ADLS_ACUITY_SCORE: 36
ADLS_ACUITY_SCORE: 35
ADLS_ACUITY_SCORE: 36
ADLS_ACUITY_SCORE: 35
ADLS_ACUITY_SCORE: 36
ADLS_ACUITY_SCORE: 35
ADLS_ACUITY_SCORE: 36
ADLS_ACUITY_SCORE: 35
ADLS_ACUITY_SCORE: 36

## 2025-05-20 NOTE — ANESTHESIA PROCEDURE NOTES
Airway       Patient location during procedure: OR       Procedure Start/Stop Times: 5/20/2025 8:10 AM  Staff -        Performed By: SRNA  Consent for Airway        Urgency: elective  Indications and Patient Condition       Indications for airway management: debbie-procedural       Induction type:intravenous       Mask difficulty assessment: 1 - vent by mask    Final Airway Details       Final airway type: endotracheal airway       Successful airway: ETT - single  Endotracheal Airway Details        ETT size (mm): 7.0       Cuffed: yes       Successful intubation technique: video laryngoscopy       VL Blade Size: Glidescope 3       Grade View of Cords: 1       Adjucts: stylet       Position: Right       Measured from: lips       Secured at (cm): 22       Bite block used: None    Post intubation assessment        Placement verified by: capnometry, equal breath sounds and chest rise        Number of attempts at approach: 2       Number of other approaches attempted: 1       Secured with: tape       Ease of procedure: easy       Dentition: Intact and Unchanged    Medication(s) Administered   Medication Administration Time: 5/20/2025 8:10 AM         [Annual Wellness Visit] : an annual wellness visit

## 2025-05-20 NOTE — ANESTHESIA PREPROCEDURE EVALUATION
"Anesthesia Pre-Procedure Evaluation    Patient: Itzel Avilez   MRN: [id number] : 2005          Procedure : Procedure(s):  MASTECTOMY, BILATERAL, SIMPLE, POSSIBLE NIPPLE GRAFTS OnQ         Past Medical History:   Diagnosis Date    Chronic idiopathic thrombocytopenic purpura (H)       Past Surgical History:   Procedure Laterality Date    BONE MARROW ASPIRATE &BIOPSY  2016    DENTAL SURGERY        Allergies   Allergen Reactions    Human Papillomavirus 2-Valent Recombinant Vaccine      Other Reaction(s): reactivated ITP    Ibuprofen Other (See Comments)     Bleeding and ITP    Other Drug Allergy (See Comments)     Sulfacetamide GI Disturbance      Social History     Tobacco Use    Smoking status: Never    Smokeless tobacco: Never   Substance Use Topics    Alcohol use: Not Currently      Wt Readings from Last 1 Encounters:   25 76.3 kg (168 lb 3.4 oz)             Physical Exam  Airway  Mallampati: II  TM distance: >3 FB  Mouth opening: >= 4 cm    Cardiovascular   Rhythm: regular  Rate: normal rate     Dental   (+) Completely normal teeth      Pulmonary Breath sounds clear to auscultation        Neurological - normal exam  He appears awake, alert and oriented x3.    Other Findings       OUTSIDE LABS:  CBC:   Lab Results   Component Value Date    WBC 7.3 2017    HGB 13.2 2017     07/10/2017     2017     BMP:   Lab Results   Component Value Date     2017    POTASSIUM 4.1 2017    CHLORIDE 104 2024    CHLORIDE 104 2017    CO2 21 2017    BUN 8 2017    CR 0.68 2017     (A) 2017     COAGS: No results found for: \"PTT\", \"INR\", \"FIBR\"  POC: No results found for: \"BGM\", \"HCG\", \"HCGS\"  HEPATIC:   Lab Results   Component Value Date    ALBUMIN 3.4 2017    PROTTOTAL 7.3 2017    ALT <10 2017    AST 11 2017    ALKPHOS 106 2017    BILITOTAL 0.3 2017     OTHER:   Lab Results   Component " "Value Date    BELA 9.6 07/03/2017       Anesthesia Plan    ASA Status:  2       Anesthesia Type: General.  Airway: oral.  Induction: intravenous.   Techniques and Equipment:       - Monitoring Plan: standard ASA monitoring, train of four monitoring     Consents            Postoperative Care         Comments:                   Shai Marcelo DO    I have reviewed the pertinent notes and labs in the chart from the past 30 days and (re)examined the patient.  Any updates or changes from those notes are reflected in this note.    Clinically Significant Risk Factors Present on Admission                             # Overweight: Estimated body mass index is 29.8 kg/m  as calculated from the following:    Height as of this encounter: 1.6 m (5' 3\").    Weight as of this encounter: 76.3 kg (168 lb 3.4 oz).                    "

## 2025-05-20 NOTE — ANESTHESIA CARE TRANSFER NOTE
Patient: Itzel Avilez    Procedure: Procedure(s):  MASTECTOMY, BILATERAL, SIMPLE, WITH NIPPLE GRAFTS OnQ       Diagnosis: Gender dysphoria in adult [F64.0]  Diagnosis Additional Information: No value filed.    Anesthesia Type:   General     Note:    Oropharynx: spontaneously breathing  Level of Consciousness: awake  Oxygen Supplementation: blow-by O2    Independent Airway: airway patency satisfactory and stable  Dentition: dentition unchanged  Vital Signs Stable: post-procedure vital signs reviewed and stable  Report to RN Given: handoff report given  Patient transferred to: PACU    Handoff Report: Identifed the Patient, Identified the Reponsible Provider, Reviewed the pertinent medical history, Discussed the surgical course, Reviewed Intra-OP anesthesia mangement and issues during anesthesia, Set expectations for post-procedure period and Allowed opportunity for questions and acknowledgement of understanding      Vitals:  Vitals Value Taken Time   /92 05/20/25 12:30   Temp     Pulse 120 05/20/25 12:32   Resp 22 05/20/25 12:32   SpO2 98 % 05/20/25 12:32   Vitals shown include unfiled device data.    Electronically Signed By: GERARDO Cardenas CRNA  May 20, 2025  12:32 PM

## 2025-05-20 NOTE — ANESTHESIA POSTPROCEDURE EVALUATION
Patient: Itzel Avilez    Procedure: Procedure(s):  MASTECTOMY, BILATERAL, SIMPLE, WITH NIPPLE GRAFTS OnQ       Anesthesia Type:  General    Note:     Postop Pain Control: Uneventful            Sign Out: Well controlled pain   PONV: Yes            Sign Out: PONV/POV resolved with treatment   Neuro/Psych: Uneventful            Sign Out: Acceptable/Baseline neuro status   Airway/Respiratory: Uneventful            Sign Out: Acceptable/Baseline resp. status   CV/Hemodynamics: Uneventful            Sign Out: Acceptable CV status; No obvious hypovolemia; No obvious fluid overload   Other NRE:    DID A NON-ROUTINE EVENT OCCUR? No           Last vitals:  Vitals Value Taken Time   /88 05/20/25 13:30   Temp 36  C (96.8  F) 05/20/25 12:26   Pulse 72 05/20/25 13:30   Resp 12 05/20/25 13:30   SpO2 99 % 05/20/25 13:30   Vitals shown include unfiled device data.    Electronically Signed By: Shai Marcelo DO  May 20, 2025  1:31 PM

## 2025-05-20 NOTE — LETTER
[facility]  [address]  [facility] [address]  Phone: [phone]    May 20, 2025        Majo Avilez  [address] [place] [address]          To whom it may concern:    RE: Majo Avilez    Please excuse Majo Avilez from work until 5/27/2025.  She was present at [facility] 5/20/2025 for her child having a procedure and will need to be at home to help with cares during recovery.     Please contact me for questions or concerns.      Sincerely,      Oksana Garcia MD

## 2025-05-20 NOTE — BRIEF OP NOTE
[facility] Brief Operative Note    Pre-operative diagnosis: Gender dysphoria in adult [F64.0]   Post-operative diagnosis * No post-op diagnosis entered *     Procedure: Procedure(s):  MASTECTOMY, BILATERAL, SIMPLE, WITH NIPPLE GRAFTS OnQ   Surgeon(s): Surgeons and Role:     * Oksana Garcia MD - Primary     * Diann Ruiz PA-C - Assisting   Estimated blood loss: EBL 75  UO 200ml  IVF 1500   Specimens: ID Type Source Tests Collected by Time Destination   1 : left breast Tissue Breast, Left SURGICAL PATHOLOGY EXAM Oksana Garcia MD 5/20/2025 10:03 AM    2 : right breast Tissue Breast, Right SURGICAL PATHOLOGY EXAM Oksana Garcia MD 5/20/2025 10:05 AM    3 : left breast Tissue Breast, Left SURGICAL PATHOLOGY EXAM Oksana Garcia MD 5/20/2025 10:36 AM    4 : right breast Tissue Breast, Right SURGICAL PATHOLOGY EXAM Oksana Garcia MD 5/20/2025 10:36 AM       Findings: none

## 2025-05-20 NOTE — OP NOTE
OPERATIVE NOTE    DATE OF PROCEDURE: 5/20/25  ATTENDING: Jose MORLEY SURGEON: none  ASSISTANT: Diann Ruiz PA-C (no resident available, PA did 50% of case)  PREOP DIAGNOSIS: Gender dysphoria  POSTOP DIAGNOSIS: Same  PROCEDURE: Bilateral simple mastectomy with nipple graft reconstruction.  On-Q catheter placement.  ANESTHESIA: GETA  EBL: 75 cc  IV FLUIDS: 1500 cc  URINE OUTPUT: 200 cc  COUNTS: Correct  COMPLICATIONS: None  DRAINS: ROXANA x2  SPECIMENS: Right breast 601 g, left breast 569 g.     INDICATIONS: This is a 19 year old biologic female with a diagnosis of gender dysphoria who met WPATH and insurance criteria for gender affirming top surgery.  Due to breast hypertrophy and nipple ptosis, they would require a double incision approach to their mastectomy.  They did desire nipple graft reconstruction. Of note, this patient has ITP with a preop platelet count in the 300's. This was repeated in the OR and found to be 169.  TXA was given near the end of the case due to ooziness of incisions.      PROCEDURE: The patient was seen in the preoperative waiting area.  The operative sites were marked.  This included the sternal notch, sternal midline, inframammary folds with possible extension laterally, vertical line through the nipple areolar complex, vertical lines at the medial and lateral borders of the breast footplate, transverse line at the mid humerus level, and transverse line at the inferior origin of the pectoralis major muscle on flexion.     Informed consent was obtained after reviewing the possible risks and complications including but not limited to the following: Infection, bleeding, hematoma/seroma formation, poor healing, spitting sutures, dehiscence, hypertrophic scarring, partial or complete loss of nipple grafts, fat necrosis, injury to surrounding musculoskeletal or neurovascular structures- including intrathoracic or intra-axillary structures, residual asymmetries or deformities, altered  sensation of the chest wall-either hypo or hyper-sensitivity, need for further surgery, and anesthetic risks such as DVT, PE, cardiopulmonary arrest.     The patient was brought to the operating room on a stretcher and transferred to the OR table in a supine position.  After general anesthesia was administered and the patient was intubated with ETT, a Eagle was  placed.  The patient already had sequential compression devices on their lower extremities prior to induction.  Thighs and forelegs were supported on pillows and secured with padded safety straps.  A lower Tang hugger was placed.  Arms were secured to arm boards at 90 degrees from the table using 6 inch Ace wraps and padding.  The patient was then put into a sitting position on the OR table and adjustments were made as necessary to gain symmetry of the chest breast area.  The chest breast was then prepped with the usual ChloraPrep and draped in the usual sterile fashion.  A timeout was taken and the proper patient and procedure were identified.     We made our inframammary fold incisions with scalpel and traditional ValleyLab cautery on the left and Peak PlasmaBlade cautery on the right.  We left approximately 2 to 3 cm of subcutaneous fat along the IMF incision before beveling down to the pectoral fascia.  The breast mound was elevated off the pec fascia superiorly towards the clavicle, medially towards the parasternal border, and laterally past the lateral border of the pectoralis major muscle.  This allowed us to displace the breast mound inferiorly and rinku where it overlapped with the IMF incision.  This was just slightly lower than our preop markings, allowing for any necessary adjustments to shape and level of incision.       Since this patient was interested in nipple grafting, the nipple areolar complexes were harvested sharply with a 10 blade.  These were then kept on the back table wrapped in normal saline gauze and marked per side.       We then  amputated the breast mound.  Additional thinning of the superior skin flap was done based on their body habitus.  In an effort to gain better symmetry with regard to contour, flap thickness was adjusted to camouflage any significant asymmetries such as prominent ribs or musculature. This patient had a mildly tapered pec contour infero-laterally, as well as mildly prominent right inferior medial rib.   Once we were happy with our original resection, our incisions were temporarily skin stapled and the patient put into a sitting position.  This allowed us to make further adjustments with regard to the level and shape of the IMF incision.  These additional areas were then resected and added to the path specimens which were passed off the back table and weighed before sending to pathology for permanent histologic exam.       Once we were happy with symmetry of contour and incisions,  including elimination of any dogears medially or laterally, we then irrigated the dissection pocket with antibiotic saline solution.  #15 round channel drains were introduced through a separate stab wound incision laterally and secured with 3-0 nylon suture.  This was draped along the inferior pocket.  Dual 10 inch On-Q catheters were percutaneously introduced from the epigastric region and draped along the superior area of the dissection pocket.  These were secured with benzoin and Tegaderm.       Definitive closure was then achieved with 2-0 Vicryl deep sutures between the breast capsule and the upper abdominal fascia.  Additional 2-0 and 3-0 Vicryl buried sutures were used for deep dermal closure.  4-0 Vicryl was used for running subcuticular suture.  5-0 fast-absorbing gut was used to touch up any areas of the skin closure.       We then turned our attention to creating the nipple grafts.  The nipple areolar complexes had been aggressively thinned on the back table to facilitate graft take.  We put the patient into a sitting position and  used templates for nipple grafts approximately 2 x 1.6 cm.  Once we were able to identify the most aesthetic position for nipple grafting, these were marked and de-epithelialized sharply with a 15 blade.  Hemostasis was achieved with judicious cautery and direct pressure.  Any excess areola and excess nipple were then trimmed from the thinned nipple areolar complex to fit the recipient site.  The graft was anchored with 5-0 fast-absorbing gut interrupted and running cuticular sutures.  Additional sutures were used to anchor the nipple centrally and to give additional definition.       Once we were happy with the appearance of the chest and nipple grafts, we then applied Dermabond Prineo along the incisions.  ROXANA drain tubing was trimmed and put to bulb suction.  Nipple bolster dressings consisting of Xeroform sheets with antibiotic soaked cotton balls were held in place with 2-0 silk ties and skin staples.  ABD pads were used for drain sponges.  A couple 6 inch Kerlix rolls were unfurled across the anterior chest for padding.  The thorax was then circumferentially wrapped with a double long 6 inch Ace wrap for compression.  The Eagle was  discontinued and the patient was extubated.  They were transferred to a stretcher.  The On-Q catheters were attached to the reservoir containing 550 cc of 0.2% ropivacaine to be delivered at 2 cc/h per catheter for the next 5 days.  The patient was then taken to the recovery room in satisfactory condition having tolerated the procedure without difficulty and complication.  Final weights as measured in the OR were right breast equals 601  g and left breast equals 569  g.

## 2025-05-20 NOTE — TELEPHONE ENCOUNTER
[facility] Call Center    Phone Message    May a detailed message be left on voicemail: yes     Reason for Call: The patients mother called asking if the 6/30/25 post-op can be a virtual visit? If not, they will need to reschedule. Please contact Huang or mom Majo.     Action Taken: Message routed to:  [facility] Care    Travel Screening: Not Applicable

## 2025-05-20 NOTE — DISCHARGE INSTRUCTIONS
Chen Castillo's office at [phone] at the [facility] from 8 am till 5 pm

## 2025-05-21 ASSESSMENT — ACTIVITIES OF DAILY LIVING (ADL)
ADLS_ACUITY_SCORE: 36

## 2025-05-21 NOTE — CONFIDENTIAL NOTE
Left  with callback number. Changed appt to virtual but did tell mom in  that Huang will need to be in [place] for the virtual appt.

## 2025-05-22 ENCOUNTER — TELEPHONE (OUTPATIENT)
Dept: PLASTIC SURGERY | Facility: CLINIC | Age: 20
End: 2025-05-22
Payer: COMMERCIAL

## 2025-05-22 ASSESSMENT — ACTIVITIES OF DAILY LIVING (ADL)
ADLS_ACUITY_SCORE: 36

## 2025-05-22 NOTE — TELEPHONE ENCOUNTER
RN called Huang for post-surgery follow up on POD-2.     Patient's reported pain ratin/10. Pain is reported to be located along the incisions.  Patient is currently taking tylenol for pain with the following frequency: q6h. Taking oxycodone as needed right now.    Any concerns regarding drain: denies, mom is stripping drains  Reported output of Left drain this mornin mL  Reported output of Right drain this morning: 10 mL    Any concerns regarding  Nausea: some last night  Constipation: Yes, taking Miralax, is passing gas, is walking around and using stairs. Can add Senna, take 2x a day  Incisions: all wrapped up in Ace wrap  Nipple grafts: wrapped up  On-Q: not leaking    Other concerns: Taking TXA oral per hematologist    RN advised pt to call if they need anything refilled tomorrow before the long weekend.  Karly De Jesus RN on 2025 at 2:22 PM

## 2025-05-23 LAB
PATH REPORT.COMMENTS IMP SPEC: NORMAL
PATH REPORT.COMMENTS IMP SPEC: NORMAL
PATH REPORT.FINAL DX SPEC: NORMAL
PATH REPORT.GROSS SPEC: NORMAL
PATH REPORT.MICROSCOPIC SPEC OTHER STN: NORMAL
PATH REPORT.RELEVANT HX SPEC: NORMAL
PHOTO IMAGE: NORMAL

## 2025-05-23 ASSESSMENT — ACTIVITIES OF DAILY LIVING (ADL)
ADLS_ACUITY_SCORE: 36

## 2025-05-24 ASSESSMENT — ACTIVITIES OF DAILY LIVING (ADL)
ADLS_ACUITY_SCORE: 36

## 2025-05-25 ASSESSMENT — ACTIVITIES OF DAILY LIVING (ADL)
ADLS_ACUITY_SCORE: 36

## 2025-05-27 ENCOUNTER — OFFICE VISIT (OUTPATIENT)
Dept: PLASTIC SURGERY | Facility: CLINIC | Age: 20
End: 2025-05-27
Payer: COMMERCIAL

## 2025-05-27 VITALS
BODY MASS INDEX: 29.41 KG/M2 | TEMPERATURE: 98.1 F | HEART RATE: 85 BPM | DIASTOLIC BLOOD PRESSURE: 85 MMHG | OXYGEN SATURATION: 97 % | WEIGHT: 166 LBS | SYSTOLIC BLOOD PRESSURE: 126 MMHG | HEIGHT: 63 IN

## 2025-05-27 DIAGNOSIS — Z90.13 S/P BILATERAL MASTECTOMY: Primary | ICD-10-CM

## 2025-05-27 ASSESSMENT — PAIN SCALES - GENERAL: PAINLEVEL_OUTOF10: NO PAIN (0)

## 2025-05-27 NOTE — NURSING NOTE
"Chief Complaint   Patient presents with    YULISA Encinas, is being seen today for a 1 wk post-op DOS 5/20/25       Vitals:    05/27/25 0817   BP: 126/85   BP Location: Left arm   Patient Position: Chair   Cuff Size: Adult Regular   Pulse: 85   Temp: 98.1  F (36.7  C)   TempSrc: Oral   SpO2: 97%   Weight: 75.3 kg (166 lb)   Height: 1.6 m (5' 3\")       Body mass index is 29.41 kg/m .      Francie Mcbride LPN    "

## 2025-05-27 NOTE — PROGRESS NOTES
"Plastic Surgery Outpatient Visit    ID: Huang Avilez is a 19 year old other s/p bilateral mastectomy with nipple grafts for gender dysphoria 5/20/2025 with Dr. Garcia.     S: Huang is doing well. His pain has been well controlled. He has not even needed tylenol the past couple days. Drain outputs have been < 30 ml per side per day for several days and meet removal criteria.    O:  /85 (BP Location: Left arm, Patient Position: Chair, Cuff Size: Adult Regular)   Pulse 85   Temp 98.1  F (36.7  C) (Oral)   Ht 1.6 m (5' 3\")   Wt 75.3 kg (166 lb)   SpO2 97%   BMI 29.41 kg/m     General: NAD  Chest: Bilateral chest incisions c/d/I with incisional tape in place. Bilateral nipple grafts viable and well-adhered. No significant bruising or swelling. Good contour of upper chest.    PATH: Bilateral breast, simple mastectomy: benign breast tissue. Negative for atypia or malignancy    A/P:  S/P bilateral mastectomy  -healing well  -drains removed today  -nipple dressings x1 week  -wrap x1 week  - tape off in 2 weeks.  -Trex/5lb lifting restrictions x2 more weeks. Ok to increase movement and lifting up to 10lbs after this until 6 weeks post op, when restrictions will likely be cleared.  -RTC 6 weeks with Dr. Jose Bernstein, APRN, CNP  [facility]    20 minutes spent on the date of the encounter doing chart review, history and physical, dressing changes, documentation and further activity as noted above.   "

## 2025-05-27 NOTE — PATIENT INSTRUCTIONS
Care of Nipples and Chest Incisions    Change nipple dressings once daily for 1 week. Shower with dressings in place for 1 week. After that, it is ok to shower without dressings on your nipple grafts. No DIRECT shower spray on nipple grafts for 4 weeks from your surgery date, but water running over incisions and grafts is ok.      Nipple graft dressing changes: Cut vaseline gauze to nipple size and place over nipple. Place folded white gauze over vaseline gauze and cover with plastic dressing. Do dressing changes for 1 week from your post op visit. You may then apply a thin layer of Aquaphor to the nipples until healed.     Keep compression on for 1 more week from today. Continue modified T-Hans arms and do not life more than 5 pounds for 2 more weeks. At 3 weeks post op you may begin to use your arms as you normally would and you can lift up to 10 pounds until your 6 week post-op appointment with Dr Garcia, when restrictions are typically lifted. Remove the tape from your incisions by 3 weeks post op. You may start moisturizing your incisions with any non-scented, non-glittered lotion 3 weeks from your surgery date. You can start scar care after the tape is removed. Any over the counter scar care is ok, we recommend silicone strips or sheets. These can be purchased on [employer] and at [employer] and [employer].     At one month post op, baseline shoulder motion should return. Full shoulder motion should return by 8 weeks.      When to call:  Sudden increase of swelling or pain on one side  Uncontrolled pain despite pain medication  Worsening of chest swelling   Separation of nipple from chest skin  Redness and warmth in chest area  Fever > 101     Contact the RN between 8-3:30 Mon-Fri with questions or concerns through my chart message via your doctor's name (most efficient) or call at [phone].   For urgent medical issues that cannot wait, call [phone] Mon-Fri 8:-4:30.     After hours, weekends or holidays, call  [phone] and ask to speak to the on call plastic surgeon fellow.

## 2025-05-27 NOTE — LETTER
"5/27/2025       RE: Itzel Avilez  [address] [address]     Dear Colleague,    Thank you for referring your patient, Itzel Avilez, to the [facility] at [facility]. Please see a copy of my visit note below.    Plastic Surgery Outpatient Visit    ID: Huang Avilez is a 19 year old other s/p bilateral mastectomy with nipple grafts for gender dysphoria 5/20/2025 with Dr. Garcia.     S: Huang is doing well. His pain has been well controlled. He has not even needed tylenol the past couple days. Drain outputs have been < 30 ml per side per day for several days and meet removal criteria.    O:  /85 (BP Location: Left arm, Patient Position: Chair, Cuff Size: Adult Regular)   Pulse 85   Temp 98.1  F (36.7  C) (Oral)   Ht 1.6 m (5' 3\")   Wt 75.3 kg (166 lb)   SpO2 97%   BMI 29.41 kg/m     General: NAD  Chest: Bilateral chest incisions c/d/I with incisional tape in place. Bilateral nipple grafts viable and well-adhered. No significant bruising or swelling. Good contour of upper chest.    PATH: Bilateral breast, simple mastectomy: benign breast tissue. Negative for atypia or malignancy    A/P:  S/P bilateral mastectomy  -healing well  -drains removed today  -nipple dressings x1 week  -wrap x1 week  - tape off in 2 weeks.  -Trex/5lb lifting restrictions x2 more weeks. Ok to increase movement and lifting up to 10lbs after this until 6 weeks post op, when restrictions will likely be cleared.  -RTC 6 weeks with GERARDO Jasos, CNP  [facility]    20 minutes spent on the date of the encounter doing chart review, history and physical, dressing changes, documentation and further activity as noted above.     Again, thank you for allowing me to participate in the care of your patient.      Sincerely,    GERARDO Weiner CNP    "

## 2025-06-30 ENCOUNTER — TELEPHONE (OUTPATIENT)
Dept: WOUND CARE | Facility: CLINIC | Age: 20
End: 2025-06-30

## 2025-06-30 NOTE — TELEPHONE ENCOUNTER
Called and left message regarding video visit with Dr. Garcia at 3:30.    Call back number was provided.

## 2025-07-19 ENCOUNTER — HEALTH MAINTENANCE LETTER (OUTPATIENT)
Age: 20
End: 2025-07-19

## (undated) DEVICE — BNDG ELASTIC 6"X5YDS UNSTERILE 6611-60

## (undated) DEVICE — FILTER HEPA FLUID TRAP NEPTUNE 0703-040-001

## (undated) DEVICE — NDL 18GA 1.5" 305196

## (undated) DEVICE — DRSG XEROFORM 5X9" 8884431605

## (undated) DEVICE — EYE MARKING PAD 581057

## (undated) DEVICE — LINEN BACK PACK 5440

## (undated) DEVICE — SOLUTION WATER 1000ML BOTTLE R5000-01

## (undated) DEVICE — SU VICRYL 3-0 FS-1 27" J442H

## (undated) DEVICE — DRSG TEGADERM 2 3/8X2 3/4" 1624W

## (undated) DEVICE — GLOVE BIOGEL PI MICRO INDICATOR UNDERGLOVE SZ 7.0 48970

## (undated) DEVICE — GOWN XLG DISP 9545

## (undated) DEVICE — SUCTION MANIFOLD NEPTUNE 2 SYS 4 PORT 0702-020-000

## (undated) DEVICE — Device

## (undated) DEVICE — SYR 10ML PREFILLED 0.9% NACL INJ NOT STERILE 306547

## (undated) DEVICE — DRSG KERLIX 4 1/2"X4YDS ROLL 6730

## (undated) DEVICE — DRAPE LAP TRANSVERSE 29421

## (undated) DEVICE — PAD CHUX UNDERPAD 30X36" P3036C

## (undated) DEVICE — GLOVE SURG PI ULTRA TOUCH M SZ 6-1/2 LF

## (undated) DEVICE — STRAP KNEE/BODY 31143004

## (undated) DEVICE — SU VICRYL 2-0 CT-1 27" J339H

## (undated) DEVICE — STRAP POSITIONING 60X31" BODY KNEE KBS 01

## (undated) DEVICE — DRAIN JACKSON PRATT CHANNEL 15FR ROUND HUBLESS SIL JP-2228

## (undated) DEVICE — SYR BULB IRRIG DOVER 60 ML LATEX FREE 67000

## (undated) DEVICE — SU VICRYL+ 4-0 PS1 18IN UND VCP682G

## (undated) DEVICE — PREP CHLORAPREP 26ML TINTED HI-LITE ORANGE 930815

## (undated) DEVICE — ESU BLADE PEAK PLASMA 3.0S PS210-030S

## (undated) DEVICE — LIGHT HANDLE X2

## (undated) DEVICE — DRSG ABDOMINAL 07 1/2X8" 7197D

## (undated) DEVICE — LABEL MEDICATION SYSTEM 3303-P

## (undated) DEVICE — BLADE KNIFE SURG 15 371115

## (undated) DEVICE — DRSG MEPILEX BORDER FLEX 4X4" 595300

## (undated) DEVICE — CATH TRAY FOLEY SURESTEP 16FR WDRAIN BAG STLK LATEX A300316A

## (undated) DEVICE — TUBING SUCTION MEDI-VAC 1/4"X20' N620A

## (undated) DEVICE — UNDERPAD 36X30 PREMIERPRO MAX ABS NS LF 676111

## (undated) DEVICE — BNDG ELASTIC 6" DBL LENGTH UNSTERILE 6611-16

## (undated) DEVICE — ESU ELEC BLADE HEX-LOCKING 2.5" E1450X

## (undated) DEVICE — ESU GROUND PAD UNIVERSAL W/O CORD

## (undated) DEVICE — POSITIONER ARMBOARD FOAM CONVOLUTE CP-501

## (undated) DEVICE — SU SILK 2-0 TIE 12X30" A305H

## (undated) DEVICE — STPL SKIN 35W ROTATING HEAD PRW35

## (undated) DEVICE — DRAIN JACKSON PRATT RESERVOIR 100ML SU130-1305

## (undated) DEVICE — DRAIN RESERVOIR 100ML JP 0070740

## (undated) DEVICE — SU VICRYL+ 3-0 FS1 27IN UND VCP442H

## (undated) DEVICE — LINEN ORTHO PACK 5446

## (undated) DEVICE — ADH LIQUID MASTISOL TOPICAL VIAL 2-3ML 0523-48

## (undated) DEVICE — SOL WATER IRRIG 1000ML BOTTLE 2F7114

## (undated) DEVICE — BLADE KNIFE SURG 10 371110

## (undated) DEVICE — GLOVE BIOGEL PI MICRO SZ 6.5 48565

## (undated) DEVICE — SU PLAIN FAST ABSORB 5-0 PC-1 18" 1915G

## (undated) DEVICE — SPONGE LAP 18X18" X8435

## (undated) DEVICE — SU ETHILON 3-0 FS-1 18" 663G

## (undated) DEVICE — SPECIMEN CONTAINER 5OZ STERILE 2600SA

## (undated) DEVICE — POSITIONER ARMBOARD FOAM 1PAIR LF FP-ARMB1

## (undated) DEVICE — SU DERMABOND PRINEO 42CM CLR422US

## (undated) DEVICE — LINEN TOWEL PACK X5 5464

## (undated) DEVICE — SOLUTION IRRIGATION 0.9% NACL 1000ML BOTTLE R5200-01

## (undated) DEVICE — DRSG MEPILEX LITE 2"X 5" 581100

## (undated) DEVICE — SOL NACL 0.9% IRRIG 1000ML BOTTLE 2F7124

## (undated) RX ORDER — HYDROMORPHONE HYDROCHLORIDE 1 MG/ML
INJECTION, SOLUTION INTRAMUSCULAR; INTRAVENOUS; SUBCUTANEOUS
Status: DISPENSED
Start: 2025-05-20

## (undated) RX ORDER — BUPIVACAINE HYDROCHLORIDE 2.5 MG/ML
INJECTION, SOLUTION EPIDURAL; INFILTRATION; INTRACAUDAL; PERINEURAL
Status: DISPENSED
Start: 2025-05-20

## (undated) RX ORDER — BUPIVACAINE HYDROCHLORIDE 2.5 MG/ML
INJECTION, SOLUTION INFILTRATION; PERINEURAL
Status: DISPENSED
Start: 2025-05-20

## (undated) RX ORDER — ONDANSETRON 2 MG/ML
INJECTION INTRAMUSCULAR; INTRAVENOUS
Status: DISPENSED
Start: 2025-05-20

## (undated) RX ORDER — SODIUM CHLORIDE, SODIUM LACTATE, POTASSIUM CHLORIDE, CALCIUM CHLORIDE 600; 310; 30; 20 MG/100ML; MG/100ML; MG/100ML; MG/100ML
INJECTION, SOLUTION INTRAVENOUS
Status: DISPENSED
Start: 2025-05-20

## (undated) RX ORDER — PROPOFOL 10 MG/ML
INJECTION, EMULSION INTRAVENOUS
Status: DISPENSED
Start: 2025-05-20

## (undated) RX ORDER — FENTANYL CITRATE-0.9 % NACL/PF 10 MCG/ML
PLASTIC BAG, INJECTION (ML) INTRAVENOUS
Status: DISPENSED
Start: 2025-05-20

## (undated) RX ORDER — SCOPOLAMINE 1 MG/3D
PATCH, EXTENDED RELEASE TRANSDERMAL
Status: DISPENSED
Start: 2025-05-20

## (undated) RX ORDER — ACETAMINOPHEN 325 MG/1
TABLET ORAL
Status: DISPENSED
Start: 2025-05-20

## (undated) RX ORDER — CEFAZOLIN SODIUM/WATER 2 G/20 ML
SYRINGE (ML) INTRAVENOUS
Status: DISPENSED
Start: 2024-12-30

## (undated) RX ORDER — GLYCOPYRROLATE 0.2 MG/ML
INJECTION, SOLUTION INTRAMUSCULAR; INTRAVENOUS
Status: DISPENSED
Start: 2025-05-20

## (undated) RX ORDER — CEFAZOLIN SODIUM 1 G/3ML
INJECTION, POWDER, FOR SOLUTION INTRAMUSCULAR; INTRAVENOUS
Status: DISPENSED
Start: 2025-05-20

## (undated) RX ORDER — DIPHENHYDRAMINE HYDROCHLORIDE 50 MG/ML
INJECTION, SOLUTION INTRAMUSCULAR; INTRAVENOUS
Status: DISPENSED
Start: 2025-05-20

## (undated) RX ORDER — DEXAMETHASONE SODIUM PHOSPHATE 4 MG/ML
INJECTION, SOLUTION INTRA-ARTICULAR; INTRALESIONAL; INTRAMUSCULAR; INTRAVENOUS; SOFT TISSUE
Status: DISPENSED
Start: 2025-05-20

## (undated) RX ORDER — FENTANYL CITRATE 50 UG/ML
INJECTION, SOLUTION INTRAMUSCULAR; INTRAVENOUS
Status: DISPENSED
Start: 2025-05-20